# Patient Record
Sex: FEMALE | Race: ASIAN | Employment: OTHER | URBAN - METROPOLITAN AREA
[De-identification: names, ages, dates, MRNs, and addresses within clinical notes are randomized per-mention and may not be internally consistent; named-entity substitution may affect disease eponyms.]

---

## 2018-01-12 NOTE — MISCELLANEOUS
Message  GI Reminder Recall [facility]:   Date: 05/26/2016   Dear Janessa Mendoza:     Review of our records shows you are due for the following: Colonoscopy  Please call the following office to schedule your appointment:   [facility], [facility] [address] [place] [address], [address] [place] [address]  [phone]  We look forward to hearing from you! Sincerely,         Signatures   Electronically signed by :  Myla Irby, ; May 26 2016  4:19PM EST                       (Author)

## 2021-04-11 ENCOUNTER — PATIENT MESSAGE (OUTPATIENT)
Dept: INTERNAL MEDICINE CLINIC | Facility: CLINIC | Age: 66
End: 2021-04-11

## 2021-04-12 NOTE — TELEPHONE ENCOUNTER
From: Yaa Lund  To: Tanja Irizarry MD  Sent: 4/11/2021 1:56 PM CDT  Subject: Non-Urgent Medical Question    Hi, could you order a COVID vaccine for me so I can schedule it?  Currently, I don't see an option to schedule a vaccine for myself even though

## 2021-04-13 ENCOUNTER — IMMUNIZATION (OUTPATIENT)
Dept: LAB | Age: 66
End: 2021-04-13
Attending: HOSPITALIST
Payer: MEDICARE

## 2021-04-13 DIAGNOSIS — Z23 NEED FOR VACCINATION: Primary | ICD-10-CM

## 2021-04-13 PROCEDURE — 0001A SARSCOV2 VAC 30MCG/0.3ML IM: CPT

## 2021-04-26 ENCOUNTER — OFFICE VISIT (OUTPATIENT)
Dept: INTERNAL MEDICINE CLINIC | Facility: CLINIC | Age: 66
End: 2021-04-26
Payer: MEDICARE

## 2021-04-26 ENCOUNTER — LAB ENCOUNTER (OUTPATIENT)
Dept: LAB | Age: 66
End: 2021-04-26
Attending: INTERNAL MEDICINE
Payer: MEDICARE

## 2021-04-26 VITALS
HEART RATE: 76 BPM | RESPIRATION RATE: 12 BRPM | DIASTOLIC BLOOD PRESSURE: 86 MMHG | BODY MASS INDEX: 25.61 KG/M2 | SYSTOLIC BLOOD PRESSURE: 144 MMHG | WEIGHT: 133.88 LBS | HEIGHT: 60.5 IN | TEMPERATURE: 99 F

## 2021-04-26 DIAGNOSIS — Z13.6 ENCOUNTER FOR SCREENING FOR CARDIOVASCULAR DISORDERS: ICD-10-CM

## 2021-04-26 DIAGNOSIS — Z78.0 POSTMENOPAUSAL: ICD-10-CM

## 2021-04-26 DIAGNOSIS — R03.0 ELEVATED BLOOD PRESSURE READING: ICD-10-CM

## 2021-04-26 DIAGNOSIS — Z11.59 NEED FOR HEPATITIS C SCREENING TEST: ICD-10-CM

## 2021-04-26 DIAGNOSIS — Z12.11 SCREENING FOR COLON CANCER: ICD-10-CM

## 2021-04-26 DIAGNOSIS — Z00.00 ENCOUNTER FOR ANNUAL HEALTH EXAMINATION: ICD-10-CM

## 2021-04-26 DIAGNOSIS — M85.80 OSTEOPENIA, UNSPECIFIED LOCATION: ICD-10-CM

## 2021-04-26 DIAGNOSIS — Z12.31 VISIT FOR SCREENING MAMMOGRAM: ICD-10-CM

## 2021-04-26 DIAGNOSIS — Z13.5 SCREENING FOR GLAUCOMA: ICD-10-CM

## 2021-04-26 PROCEDURE — 85025 COMPLETE CBC W/AUTO DIFF WBC: CPT

## 2021-04-26 PROCEDURE — 86803 HEPATITIS C AB TEST: CPT

## 2021-04-26 PROCEDURE — 82306 VITAMIN D 25 HYDROXY: CPT

## 2021-04-26 PROCEDURE — 99204 OFFICE O/P NEW MOD 45 MIN: CPT | Performed by: INTERNAL MEDICINE

## 2021-04-26 PROCEDURE — G0438 PPPS, INITIAL VISIT: HCPCS | Performed by: INTERNAL MEDICINE

## 2021-04-26 PROCEDURE — 80053 COMPREHEN METABOLIC PANEL: CPT

## 2021-04-26 PROCEDURE — 36415 COLL VENOUS BLD VENIPUNCTURE: CPT

## 2021-04-26 PROCEDURE — 80061 LIPID PANEL: CPT

## 2021-04-26 NOTE — PATIENT INSTRUCTIONS
Alma Arndt SCREENING SCHEDULE   Tests on this list are recommended by your physician but may not be covered, or covered at this frequency, by your insurer. Please check with your insurance carrier before scheduling to verify coverage.    OREN this or any previous visit. No flowsheet data found. Fecal Occult Blood   Covered Annually No results found for: FOB, OCCULTSTOOL No flowsheet data found.      Barium Enema-   uncomfortable but covered  Covered but uncomfortable   Glaucoma Screening Medium/high risk factors:   End-stage renal disease   Hemophiliacs who received Factor VIII or IX concentrates   Clients of institutions for the mentally retarded   Persons who live in the same house as a HepB virus carrier   Homosexual men   Illicit injec

## 2021-04-26 NOTE — PROGRESS NOTES
HPI:   Radha Meng is a 76year old female who presents for a Medicare Initial Preventative Physical Exam (Welcome to Medicare- < 12 months on Medicare). No current complaints.      [place] and just came back to the [address] [place] [address] [address] Patient Care Team:  Paulette Qureshi MD as PCP - General (Internal Medicine)    There is no problem list on file for this patient.     Wt Readings from Last 3 Encounters:  04/26/21 : 133 lb 14.4 oz (60.7 kg)     Last Cholesterol Labs:   No results found for: C gallop, regular rate and rhythm  Abdomen: soft, non-tender; bowel sounds normal; no masses,  no organomegaly  Pelvic: GENITAL/URINARY:  External Genitalia:  General appearance; normal, Hair distribution; normal, Lesions absent, Urethral Meatus:  Size nathalia reading  -     COMP METABOLIC PANEL (14); Future  -     LIPID PANEL; Future  -     CBC WITH DIFFERENTIAL WITH PLATELET; Future  Improved upon recheck. Check at home and if elevated bring in readings and cuff.      Osteopenia, unspecified location  -     V Screen every 10 years Colonoscopy Never done Update Health Maintenance if applicable    Flex Sigmoidoscopy Screen every 10 years No results found for this or any previous visit. No flowsheet data found.      Fecal Occult Blood Annually No results found for: Medicare Part B No vaccine history found This may be covered with your pharmacy  prescription benefits                        Template: MERLYN GOLDBERG MEDICARE ANNUAL ASSESSMENT FEMALE [68671]

## 2021-04-27 NOTE — PROGRESS NOTES
Spoke to pt. Made aware of results & recommendations. Pt voiced understanding.   Rx and lab orders sent

## 2021-05-04 ENCOUNTER — IMMUNIZATION (OUTPATIENT)
Dept: LAB | Age: 66
End: 2021-05-04
Attending: HOSPITALIST
Payer: MEDICARE

## 2021-05-04 DIAGNOSIS — Z23 NEED FOR VACCINATION: Primary | ICD-10-CM

## 2021-05-04 PROCEDURE — 0002A SARSCOV2 VAC 30MCG/0.3ML IM: CPT

## 2021-05-27 ENCOUNTER — PATIENT MESSAGE (OUTPATIENT)
Dept: INTERNAL MEDICINE CLINIC | Facility: CLINIC | Age: 66
End: 2021-05-27

## 2021-05-27 NOTE — TELEPHONE ENCOUNTER
From: Kenneth Lauren  To: Timothy Hernandez MD  Sent: 5/27/2021 3:25 PM CDT  Subject: Other    Referring to your letter today (5/27/21), I would like to inform that I would like to go through the tests you prescribed sometime towards the early part of 2022.  A

## 2021-06-18 ENCOUNTER — PATIENT MESSAGE (OUTPATIENT)
Dept: INTERNAL MEDICINE CLINIC | Facility: CLINIC | Age: 66
End: 2021-06-18

## 2021-06-21 NOTE — TELEPHONE ENCOUNTER
From: Sabrina Aparicio  To: Duke Melvin MD  Sent: 6/18/2021 5:23 PM CDT  Subject: Prescription Question    Vit D course prescribed by you are completed. I'll be out of town from 6/23 to 7/27.  You were suggesting that I should go for a test after completin

## 2021-06-22 ENCOUNTER — LABORATORY ENCOUNTER (OUTPATIENT)
Dept: LAB | Age: 66
End: 2021-06-22
Attending: INTERNAL MEDICINE
Payer: MEDICARE

## 2021-06-22 DIAGNOSIS — E55.9 VITAMIN D DEFICIENCY: ICD-10-CM

## 2021-06-22 DIAGNOSIS — E56.9 VITAMIN DEFICIENCY: ICD-10-CM

## 2021-06-22 PROCEDURE — 36415 COLL VENOUS BLD VENIPUNCTURE: CPT

## 2021-06-22 PROCEDURE — 82306 VITAMIN D 25 HYDROXY: CPT

## 2021-10-29 ENCOUNTER — PATIENT MESSAGE (OUTPATIENT)
Dept: INTERNAL MEDICINE CLINIC | Facility: CLINIC | Age: 66
End: 2021-10-29

## 2021-10-29 ENCOUNTER — HOSPITAL ENCOUNTER (OUTPATIENT)
Dept: MAMMOGRAPHY | Age: 66
Discharge: HOME | End: 2021-10-29
Attending: INTERNAL MEDICINE
Payer: MEDICARE

## 2021-10-29 ENCOUNTER — HOSPITAL ENCOUNTER (OUTPATIENT)
Dept: BONE DENSITY | Age: 66
Discharge: HOME | End: 2021-10-29
Attending: INTERNAL MEDICINE
Payer: MEDICARE

## 2021-10-29 DIAGNOSIS — Z12.31 VISIT FOR SCREENING MAMMOGRAM: ICD-10-CM

## 2021-10-29 DIAGNOSIS — Z78.0 POSTMENOPAUSAL: ICD-10-CM

## 2021-10-29 PROCEDURE — 77080 DXA BONE DENSITY AXIAL: CPT | Performed by: INTERNAL MEDICINE

## 2021-10-29 PROCEDURE — 77063 BREAST TOMOSYNTHESIS BI: CPT | Performed by: INTERNAL MEDICINE

## 2021-10-29 PROCEDURE — 77067 SCR MAMMO BI INCL CAD: CPT | Performed by: INTERNAL MEDICINE

## 2021-10-29 NOTE — TELEPHONE ENCOUNTER
From: Renetta Arreguin  To: Thania Moe MD  Sent: 10/29/2021 11:26 AM CDT  Subject: Mammo and Dexa scan    Just wanted to inform that I got my Mammo and Dexa screenings done today at [facility], [facility].  I gave them my previous imagings from

## 2021-11-03 ENCOUNTER — LAB ENCOUNTER (OUTPATIENT)
Dept: LAB | Age: 66
End: 2021-11-03
Attending: INTERNAL MEDICINE
Payer: MEDICARE

## 2021-11-03 ENCOUNTER — OFFICE VISIT (OUTPATIENT)
Dept: INTERNAL MEDICINE CLINIC | Facility: CLINIC | Age: 66
End: 2021-11-03
Payer: MEDICARE

## 2021-11-03 VITALS
DIASTOLIC BLOOD PRESSURE: 72 MMHG | BODY MASS INDEX: 24.41 KG/M2 | OXYGEN SATURATION: 100 % | RESPIRATION RATE: 16 BRPM | SYSTOLIC BLOOD PRESSURE: 132 MMHG | HEIGHT: 60.5 IN | TEMPERATURE: 99 F | WEIGHT: 127.63 LBS | HEART RATE: 95 BPM

## 2021-11-03 DIAGNOSIS — M81.8 OTHER OSTEOPOROSIS WITHOUT CURRENT PATHOLOGICAL FRACTURE: Primary | ICD-10-CM

## 2021-11-03 DIAGNOSIS — R03.0 ELEVATED BLOOD PRESSURE READING: ICD-10-CM

## 2021-11-03 DIAGNOSIS — M81.8 OTHER OSTEOPOROSIS WITHOUT CURRENT PATHOLOGICAL FRACTURE: ICD-10-CM

## 2021-11-03 PROCEDURE — 99213 OFFICE O/P EST LOW 20 MIN: CPT | Performed by: INTERNAL MEDICINE

## 2021-11-03 PROCEDURE — 82306 VITAMIN D 25 HYDROXY: CPT

## 2021-11-03 PROCEDURE — 90662 IIV NO PRSV INCREASED AG IM: CPT | Performed by: INTERNAL MEDICINE

## 2021-11-03 PROCEDURE — 36415 COLL VENOUS BLD VENIPUNCTURE: CPT

## 2021-11-03 PROCEDURE — G0008 ADMIN INFLUENZA VIRUS VAC: HCPCS | Performed by: INTERNAL MEDICINE

## 2021-11-03 NOTE — PROGRESS NOTES
[facility]    CHIEF COMPLAINT:  Patient presents with: Follow - Up: Pt here to review Dexa scan results. HM: Cscope not on file, jose 10/29/21, physical 4/26/21        HISTORY OF PRESENT ILLNESS:  Here for follow up dexa scan.    Showed osteoporos - VITAMIN D, 25-HYDROXY; Future  - ENDOCRINOLOGY - INTERNAL    2. Elevated blood pressure reading  Normal at home. Monitor periodically. Return for annual wellness visit when due.       Kirstin Vivar MD

## 2021-11-17 ENCOUNTER — PATIENT MESSAGE (OUTPATIENT)
Dept: INTERNAL MEDICINE CLINIC | Facility: CLINIC | Age: 66
End: 2021-11-17

## 2021-11-17 NOTE — TELEPHONE ENCOUNTER
From: Orestes Norberto  To: Primo Solorzano MD  Sent: 11/17/2021 11:21 AM CST  Subject: Covid Booster shot    Good morning, just to let you know for the records that I took my Covid Booster   [employer] )shot on 11/15/21. Thank you.

## 2021-12-22 ENCOUNTER — PATIENT MESSAGE (OUTPATIENT)
Dept: INTERNAL MEDICINE CLINIC | Facility: CLINIC | Age: 66
End: 2021-12-22

## 2021-12-22 NOTE — TELEPHONE ENCOUNTER
From: Sabrina Aparicio  To: Duke Melvin MD  Sent: 12/22/2021 8:46 AM CST  Subject: Prevnar 13 Vaccine    GM, took Prevnar 13 yest. In our Pharmacy. Please update your records.   Thanks  [place] [employer]

## 2022-02-17 ENCOUNTER — PATIENT MESSAGE (OUTPATIENT)
Dept: INTERNAL MEDICINE CLINIC | Facility: CLINIC | Age: 67
End: 2022-02-17

## 2022-02-18 ENCOUNTER — PATIENT MESSAGE (OUTPATIENT)
Dept: INTERNAL MEDICINE CLINIC | Facility: CLINIC | Age: 67
End: 2022-02-18

## 2022-02-18 NOTE — TELEPHONE ENCOUNTER
From: Marvin Baez  To: Margarette Terry MD  Sent: 2/17/2022 4:35 PM CST  Subject: Endocrinologist visit    Hi Dr. Martin Plain time you referred me to Arcadio Olivier, Endocrinologist for taking Zoledronic acid infusion for my bone density issue. Last year I took it in [facility] on Feb 24th 2021. As it is one year past I took an appointment as you suggested for March 1st. Can you please send a prescription for the above so I can take it when I visit Dr. Demian Romano.     Thank you  Ken Rae

## 2022-03-01 ENCOUNTER — OFFICE VISIT (OUTPATIENT)
Dept: ENDOCRINOLOGY CLINIC | Facility: CLINIC | Age: 67
End: 2022-03-01
Payer: MEDICARE

## 2022-03-01 ENCOUNTER — LAB ENCOUNTER (OUTPATIENT)
Dept: LAB | Facility: HOSPITAL | Age: 67
End: 2022-03-01
Attending: INTERNAL MEDICINE
Payer: MEDICARE

## 2022-03-01 VITALS
WEIGHT: 124 LBS | DIASTOLIC BLOOD PRESSURE: 78 MMHG | BODY MASS INDEX: 24 KG/M2 | SYSTOLIC BLOOD PRESSURE: 156 MMHG | HEART RATE: 69 BPM

## 2022-03-01 DIAGNOSIS — M81.0 AGE-RELATED OSTEOPOROSIS WITHOUT CURRENT PATHOLOGICAL FRACTURE: ICD-10-CM

## 2022-03-01 DIAGNOSIS — E55.9 VITAMIN D DEFICIENCY: ICD-10-CM

## 2022-03-01 DIAGNOSIS — M81.0 AGE-RELATED OSTEOPOROSIS WITHOUT CURRENT PATHOLOGICAL FRACTURE: Primary | ICD-10-CM

## 2022-03-01 LAB
ALBUMIN SERPL-MCNC: 4.2 G/DL (ref 3.4–5)
ALBUMIN/GLOB SERPL: 1.2 {RATIO} (ref 1–2)
ALT SERPL-CCNC: 24 U/L
ANION GAP SERPL CALC-SCNC: 4 MMOL/L (ref 0–18)
AST SERPL-CCNC: 28 U/L (ref 15–37)
BILIRUB SERPL-MCNC: 0.3 MG/DL (ref 0.1–2)
BUN BLD-MCNC: 20 MG/DL (ref 7–18)
BUN/CREAT SERPL: 27.8 (ref 10–20)
CALCIUM BLD-MCNC: 10.3 MG/DL (ref 8.5–10.1)
CHLORIDE SERPL-SCNC: 106 MMOL/L (ref 98–112)
CO2 SERPL-SCNC: 27 MMOL/L (ref 21–32)
CREAT BLD-MCNC: 0.72 MG/DL
FASTING STATUS PATIENT QL REPORTED: NO
GLOBULIN PLAS-MCNC: 3.6 G/DL (ref 2.8–4.4)
GLUCOSE BLD-MCNC: 83 MG/DL (ref 70–99)
OSMOLALITY SERPL CALC.SUM OF ELEC: 286 MOSM/KG (ref 275–295)
POTASSIUM SERPL-SCNC: 5 MMOL/L (ref 3.5–5.1)
PROT SERPL-MCNC: 7.8 G/DL (ref 6.4–8.2)
PTH-INTACT SERPL-MCNC: 31.4 PG/ML (ref 18.5–88)
SODIUM SERPL-SCNC: 137 MMOL/L (ref 136–145)
VIT D+METAB SERPL-MCNC: 30.5 NG/ML (ref 30–100)

## 2022-03-01 PROCEDURE — 82306 VITAMIN D 25 HYDROXY: CPT | Performed by: INTERNAL MEDICINE

## 2022-03-01 PROCEDURE — 83970 ASSAY OF PARATHORMONE: CPT

## 2022-03-01 PROCEDURE — 99203 OFFICE O/P NEW LOW 30 MIN: CPT | Performed by: INTERNAL MEDICINE

## 2022-03-01 PROCEDURE — 36415 COLL VENOUS BLD VENIPUNCTURE: CPT

## 2022-03-01 PROCEDURE — 84080 ASSAY ALKALINE PHOSPHATASES: CPT

## 2022-03-01 PROCEDURE — 80053 COMPREHEN METABOLIC PANEL: CPT

## 2022-03-04 LAB — BONE SPECIFIC ALKALINE PHOSPHATASE: 12.4 UG/L

## 2022-03-30 ENCOUNTER — HOSPITAL ENCOUNTER (OUTPATIENT)
Facility: HOSPITAL | Age: 67
Setting detail: HOSPITAL OUTPATIENT SURGERY
Discharge: HOME | End: 2022-03-30
Attending: INTERNAL MEDICINE | Admitting: INTERNAL MEDICINE
Payer: MEDICARE

## 2022-03-30 ENCOUNTER — ANESTHESIA EVENT (OUTPATIENT)
Dept: ENDOSCOPY | Facility: HOSPITAL | Age: 67
End: 2022-03-30
Payer: MEDICARE

## 2022-03-30 ENCOUNTER — ANESTHESIA (OUTPATIENT)
Dept: ENDOSCOPY | Facility: HOSPITAL | Age: 67
End: 2022-03-30
Payer: MEDICARE

## 2022-03-30 VITALS
OXYGEN SATURATION: 98 % | HEART RATE: 67 BPM | DIASTOLIC BLOOD PRESSURE: 65 MMHG | HEIGHT: 60.5 IN | TEMPERATURE: 99 F | SYSTOLIC BLOOD PRESSURE: 139 MMHG | WEIGHT: 124 LBS | BODY MASS INDEX: 23.72 KG/M2 | RESPIRATION RATE: 18 BRPM

## 2022-03-30 DIAGNOSIS — Z12.11 COLON CANCER SCREENING: ICD-10-CM

## 2022-03-30 PROCEDURE — 88305 TISSUE EXAM BY PATHOLOGIST: CPT | Performed by: INTERNAL MEDICINE

## 2022-03-30 PROCEDURE — 0DBN8ZX EXCISION OF SIGMOID COLON, VIA NATURAL OR ARTIFICIAL OPENING ENDOSCOPIC, DIAGNOSTIC: ICD-10-PCS | Performed by: INTERNAL MEDICINE

## 2022-03-30 RX ORDER — SODIUM CHLORIDE, SODIUM LACTATE, POTASSIUM CHLORIDE, CALCIUM CHLORIDE 600; 310; 30; 20 MG/100ML; MG/100ML; MG/100ML; MG/100ML
INJECTION, SOLUTION INTRAVENOUS CONTINUOUS
Status: DISCONTINUED | OUTPATIENT
Start: 2022-03-30 | End: 2022-03-30

## 2022-03-30 RX ORDER — BIOTIN 1 MG
1 TABLET ORAL DAILY
COMMUNITY

## 2022-03-30 RX ORDER — LABETALOL HYDROCHLORIDE 5 MG/ML
5 INJECTION, SOLUTION INTRAVENOUS EVERY 5 MIN PRN
Status: DISCONTINUED | OUTPATIENT
Start: 2022-03-30 | End: 2022-03-30

## 2022-03-30 RX ORDER — LIDOCAINE HYDROCHLORIDE 10 MG/ML
INJECTION, SOLUTION EPIDURAL; INFILTRATION; INTRACAUDAL; PERINEURAL AS NEEDED
Status: DISCONTINUED | OUTPATIENT
Start: 2022-03-30 | End: 2022-03-30 | Stop reason: SURG

## 2022-03-30 RX ORDER — NALOXONE HYDROCHLORIDE 0.4 MG/ML
80 INJECTION, SOLUTION INTRAMUSCULAR; INTRAVENOUS; SUBCUTANEOUS AS NEEDED
Status: DISCONTINUED | OUTPATIENT
Start: 2022-03-30 | End: 2022-03-30

## 2022-03-30 RX ORDER — ONDANSETRON 2 MG/ML
4 INJECTION INTRAMUSCULAR; INTRAVENOUS AS NEEDED
Status: DISCONTINUED | OUTPATIENT
Start: 2022-03-30 | End: 2022-03-30

## 2022-03-30 RX ORDER — HYDROMORPHONE HYDROCHLORIDE 1 MG/ML
0.2 INJECTION, SOLUTION INTRAMUSCULAR; INTRAVENOUS; SUBCUTANEOUS EVERY 5 MIN PRN
Status: DISCONTINUED | OUTPATIENT
Start: 2022-03-30 | End: 2022-03-30

## 2022-03-30 RX ORDER — METOCLOPRAMIDE HYDROCHLORIDE 5 MG/ML
10 INJECTION INTRAMUSCULAR; INTRAVENOUS AS NEEDED
Status: DISCONTINUED | OUTPATIENT
Start: 2022-03-30 | End: 2022-03-30

## 2022-03-30 RX ADMIN — SODIUM CHLORIDE, SODIUM LACTATE, POTASSIUM CHLORIDE, CALCIUM CHLORIDE: 600; 310; 30; 20 INJECTION, SOLUTION INTRAVENOUS at 08:35:00

## 2022-03-30 RX ADMIN — SODIUM CHLORIDE, SODIUM LACTATE, POTASSIUM CHLORIDE, CALCIUM CHLORIDE: 600; 310; 30; 20 INJECTION, SOLUTION INTRAVENOUS at 08:19:00

## 2022-03-30 RX ADMIN — LIDOCAINE HYDROCHLORIDE 50 MG: 10 INJECTION, SOLUTION EPIDURAL; INFILTRATION; INTRACAUDAL; PERINEURAL at 08:22:00

## 2022-03-30 NOTE — ANESTHESIA POSTPROCEDURE EVALUATION
[address] [facility] Patient Status:  Hospital Outpatient Surgery   Age/Gender 78year old female MRN [id number]   Location [address] [facility] 28 Attending Ming Cole, [address] Day # 0 PCP Jen Owusu MD       Anesthesia Post-op Note    COLONOSCOPY WITH COLD SNARE POLYPECTOMY    Procedure Summary     Date: 03/30/22 Room / Location: [facility] ENDOSCOPY 02 / [address] [facility] ENDOSCOPY    Anesthesia Start: 1338 Anesthesia Stop: 6764    Procedure: COLONOSCOPY WITH COLD SNARE POLYPECTOMY (N/A ) Diagnosis:       Colon cancer screening      (polyps, hemorrhoids)    Surgeons: Anahy Arriaza DO Anesthesiologist: Ramos Brantley MD    Anesthesia Type: MAC ASA Status: 1          Anesthesia Type: MAC    Vitals Value Taken Time   BP 90/52 03/30/22 0846   Temp  03/30/22 0846   Pulse 75 03/30/22 0846   Resp 14 03/30/22 0846   SpO2 98% 03/30/22 0846       Patient Location: Endoscopy    Anesthesia Type: MAC    Airway Patency: patent    Postop Pain Control: adequate    Mental Status: mildly sedated but able to meaningfully participate in the post-anesthesia evaluation    Nausea/Vomiting: none    Cardiopulmonary/Hydration status: stable euvolemic    Complications: no apparent anesthesia related complications    Postop vital signs: stable    Dental Exam: Unchanged from Preop    Patient to be discharged home when criteria met.

## 2022-04-05 ENCOUNTER — PATIENT MESSAGE (OUTPATIENT)
Dept: INTERNAL MEDICINE CLINIC | Facility: CLINIC | Age: 67
End: 2022-04-05

## 2022-04-05 NOTE — TELEPHONE ENCOUNTER
From: Mariusz Lawson  To: Erlin Fink MD  Sent: 4/5/2022 8:42 AM CDT  Subject: Colonoscopy test    Hi Dr. Branden Morris morning,  My Colonoscopy test was done on 30th March and all good as you can see the test result in mychart.      Thank you  Gianni Villasenor

## 2022-04-24 ENCOUNTER — PATIENT MESSAGE (OUTPATIENT)
Dept: INTERNAL MEDICINE CLINIC | Facility: CLINIC | Age: 67
End: 2022-04-24

## 2022-04-27 ENCOUNTER — OFFICE VISIT (OUTPATIENT)
Dept: INTERNAL MEDICINE CLINIC | Facility: CLINIC | Age: 67
End: 2022-04-27
Payer: MEDICARE

## 2022-04-27 VITALS
WEIGHT: 125.13 LBS | HEART RATE: 72 BPM | OXYGEN SATURATION: 99 % | TEMPERATURE: 97 F | RESPIRATION RATE: 16 BRPM | SYSTOLIC BLOOD PRESSURE: 118 MMHG | HEIGHT: 60 IN | BODY MASS INDEX: 24.57 KG/M2 | DIASTOLIC BLOOD PRESSURE: 68 MMHG

## 2022-04-27 DIAGNOSIS — M81.8 OTHER OSTEOPOROSIS WITHOUT CURRENT PATHOLOGICAL FRACTURE: ICD-10-CM

## 2022-04-27 DIAGNOSIS — L98.9 SKIN LESION: ICD-10-CM

## 2022-04-27 DIAGNOSIS — Z00.00 ENCOUNTER FOR ANNUAL HEALTH EXAMINATION: ICD-10-CM

## 2022-04-27 DIAGNOSIS — Z12.31 BREAST CANCER SCREENING BY MAMMOGRAM: ICD-10-CM

## 2022-04-27 DIAGNOSIS — E78.2 MIXED HYPERLIPIDEMIA: ICD-10-CM

## 2022-04-27 PROCEDURE — G0439 PPPS, SUBSEQ VISIT: HCPCS | Performed by: INTERNAL MEDICINE

## 2022-04-27 PROCEDURE — 99397 PER PM REEVAL EST PAT 65+ YR: CPT | Performed by: INTERNAL MEDICINE

## 2022-04-27 PROCEDURE — 3074F SYST BP LT 130 MM HG: CPT | Performed by: INTERNAL MEDICINE

## 2022-04-27 PROCEDURE — 3078F DIAST BP <80 MM HG: CPT | Performed by: INTERNAL MEDICINE

## 2022-04-27 PROCEDURE — 96160 PT-FOCUSED HLTH RISK ASSMT: CPT | Performed by: INTERNAL MEDICINE

## 2022-04-27 PROCEDURE — 3008F BODY MASS INDEX DOCD: CPT | Performed by: INTERNAL MEDICINE

## 2022-05-04 ENCOUNTER — LAB ENCOUNTER (OUTPATIENT)
Dept: LAB | Age: 67
End: 2022-05-04
Attending: INTERNAL MEDICINE
Payer: MEDICARE

## 2022-05-04 DIAGNOSIS — R79.89 HIGH SERUM CALCIUM: ICD-10-CM

## 2022-05-04 DIAGNOSIS — E78.2 MIXED HYPERLIPIDEMIA: ICD-10-CM

## 2022-05-04 LAB
CALCIUM BLD-MCNC: 9.5 MG/DL (ref 8.5–10.1)
CHOLEST SERPL-MCNC: 191 MG/DL (ref ?–200)
FASTING PATIENT LIPID ANSWER: YES
HDLC SERPL-MCNC: 52 MG/DL (ref 40–59)
LDLC SERPL CALC-MCNC: 122 MG/DL (ref ?–100)
NONHDLC SERPL-MCNC: 139 MG/DL (ref ?–130)
TRIGL SERPL-MCNC: 94 MG/DL (ref 30–149)
VLDLC SERPL CALC-MCNC: 17 MG/DL (ref 0–30)

## 2022-05-04 PROCEDURE — 82310 ASSAY OF CALCIUM: CPT

## 2022-05-04 PROCEDURE — 36415 COLL VENOUS BLD VENIPUNCTURE: CPT

## 2022-05-04 PROCEDURE — 80061 LIPID PANEL: CPT

## 2022-05-16 ENCOUNTER — PATIENT MESSAGE (OUTPATIENT)
Dept: INTERNAL MEDICINE CLINIC | Facility: CLINIC | Age: 67
End: 2022-05-16

## 2022-05-17 NOTE — TELEPHONE ENCOUNTER
From: Sven Jay  To: Dina Chacon MD  Sent: 5/16/2022 6:57 PM CDT  Subject: Covid-19 - Booster shot #2    I would like to inform that I took Covid-19 - Booster shot #2 today (05/16/2022) at [facility], [address] [place] [address] details of which are as below:   [facility] - Store # [id number]  Date: 5/16/2022  LOT: [id number] Exp: 8/31/22  [employer]  This is for your reference and records.   Thank you

## 2022-06-01 ENCOUNTER — PATIENT MESSAGE (OUTPATIENT)
Dept: ENDOCRINOLOGY CLINIC | Facility: CLINIC | Age: 67
End: 2022-06-01

## 2022-06-02 NOTE — TELEPHONE ENCOUNTER
From: Michael Luna  To: Stephanie Lima MD  Sent: 6/1/2022 4:37 PM CDT  Subject: Dental clearance for reclast    Hi Dr, Today I got the dental clearance from the dentist. So can you please give me a date for my reclast as soon as possible. Again on June 13th, we will be leaving out of country [place] 3 months.    Thank you  Isabela Jeffers

## 2022-06-09 ENCOUNTER — PATIENT MESSAGE (OUTPATIENT)
Dept: ENDOCRINOLOGY CLINIC | Facility: CLINIC | Age: 67
End: 2022-06-09

## 2022-06-09 NOTE — TELEPHONE ENCOUNTER
From: Ean Willis  Sent: 6/9/2022 11:20 AM CDT  To: Serina Alvarado Clinical Staff  Subject: Dental clearance for reclast    Good morning,   Any update from insurance co. about reclast authorization      Thanks  Nicolle Oden

## 2022-06-10 ENCOUNTER — PATIENT MESSAGE (OUTPATIENT)
Dept: ENDOCRINOLOGY CLINIC | Facility: CLINIC | Age: 67
End: 2022-06-10

## 2022-06-10 NOTE — TELEPHONE ENCOUNTER
Dr. Noble Adams,  Please advise is RX for Reclast that was pended for approval on 6/1/22 has been printed/approved- no documentation that order has been faxed to [facility]

## 2022-06-11 RX ORDER — ZOLEDRONIC ACID 5 MG/100ML
5 INJECTION, SOLUTION INTRAVENOUS ONCE
Qty: 100 ML | Refills: 0 | Status: SHIPPED | OUTPATIENT
Start: 2022-06-11 | End: 2022-06-11

## 2022-06-13 ENCOUNTER — TELEPHONE (OUTPATIENT)
Dept: ENDOCRINOLOGY CLINIC | Facility: CLINIC | Age: 67
End: 2022-06-13

## 2022-06-13 DIAGNOSIS — M81.0 AGE-RELATED OSTEOPOROSIS WITHOUT CURRENT PATHOLOGICAL FRACTURE: Primary | ICD-10-CM

## 2022-06-13 RX ORDER — ZOLEDRONIC ACID 5 MG/100ML
5 INJECTION, SOLUTION INTRAVENOUS ONCE
Qty: 100 ML | Refills: 0 | Status: SHIPPED | OUTPATIENT
Start: 2022-06-13 | End: 2022-06-13

## 2022-06-13 NOTE — TELEPHONE ENCOUNTER
Dr. Rosanne Murdock,  Per message below - RX pended for Reclast with DX code M81.0 - please approve/print   Thanks

## 2022-06-13 NOTE — TELEPHONE ENCOUNTER
Sophy Rendon requesting new orders for Reclast to reflect dx codes. Please fax to Sophy Rendon at [phone]. For additional questions please call. Thank you.

## 2022-06-14 ENCOUNTER — TELEPHONE (OUTPATIENT)
Dept: ENDOCRINOLOGY CLINIC | Facility: CLINIC | Age: 67
End: 2022-06-14

## 2022-06-22 PROBLEM — M81.0 OSTEOPOROSIS: Status: ACTIVE | Noted: 2022-06-22

## 2022-07-19 ENCOUNTER — LAB ENCOUNTER (OUTPATIENT)
Dept: LAB | Age: 67
End: 2022-07-19
Attending: INTERNAL MEDICINE
Payer: MEDICARE

## 2022-07-19 DIAGNOSIS — M81.0 OSTEOPOROSIS: ICD-10-CM

## 2022-07-19 LAB
ALBUMIN SERPL-MCNC: 3.9 G/DL (ref 3.4–5)
CALCIUM BLD-MCNC: 9.9 MG/DL (ref 8.5–10.1)
CREAT BLD-MCNC: 0.79 MG/DL

## 2022-07-19 PROCEDURE — 82310 ASSAY OF CALCIUM: CPT

## 2022-07-19 PROCEDURE — 82565 ASSAY OF CREATININE: CPT

## 2022-07-19 PROCEDURE — 82040 ASSAY OF SERUM ALBUMIN: CPT

## 2022-07-21 ENCOUNTER — OFFICE VISIT (OUTPATIENT)
Dept: HEMATOLOGY/ONCOLOGY | Facility: HOSPITAL | Age: 67
End: 2022-07-21
Attending: INTERNAL MEDICINE
Payer: MEDICARE

## 2022-07-21 VITALS
OXYGEN SATURATION: 97 % | DIASTOLIC BLOOD PRESSURE: 60 MMHG | TEMPERATURE: 99 F | HEART RATE: 112 BPM | RESPIRATION RATE: 18 BRPM | SYSTOLIC BLOOD PRESSURE: 144 MMHG

## 2022-07-21 DIAGNOSIS — M81.0 OSTEOPOROSIS: Primary | ICD-10-CM

## 2022-07-21 PROCEDURE — 96365 THER/PROPH/DIAG IV INF INIT: CPT

## 2022-07-21 RX ORDER — ZOLEDRONIC ACID 5 MG/100ML
5 INJECTION, SOLUTION INTRAVENOUS ONCE
Status: COMPLETED | OUTPATIENT
Start: 2022-07-21 | End: 2022-07-21

## 2022-07-21 RX ORDER — ZOLEDRONIC ACID 5 MG/100ML
INJECTION, SOLUTION INTRAVENOUS
Status: COMPLETED
Start: 2022-07-21 | End: 2022-07-21

## 2022-07-21 RX ORDER — ZOLEDRONIC ACID 5 MG/100ML
5 INJECTION, SOLUTION INTRAVENOUS ONCE
Status: CANCELLED | OUTPATIENT
Start: 2022-07-21

## 2022-07-21 RX ADMIN — ZOLEDRONIC ACID 5 MG: 5 INJECTION, SOLUTION INTRAVENOUS at 14:06:00

## 2022-08-23 ENCOUNTER — PATIENT MESSAGE (OUTPATIENT)
Dept: INTERNAL MEDICINE CLINIC | Facility: CLINIC | Age: 67
End: 2022-08-23

## 2022-08-24 NOTE — TELEPHONE ENCOUNTER
From: Mario Fernandez  To: Yana Andujar MD  Sent: 8/23/2022 11:21 AM CDT  Subject: Shingles 2nd dose vaccination    Good morning,   Please find attached my Shingles 2nd dose vaccination information. Please update your records.   Thanks  [employer]

## 2022-11-02 ENCOUNTER — HOSPITAL ENCOUNTER (OUTPATIENT)
Dept: MAMMOGRAPHY | Age: 67
Discharge: HOME | End: 2022-11-02
Attending: INTERNAL MEDICINE
Payer: MEDICARE

## 2022-11-02 DIAGNOSIS — Z12.31 BREAST CANCER SCREENING BY MAMMOGRAM: ICD-10-CM

## 2022-11-02 PROCEDURE — 77067 SCR MAMMO BI INCL CAD: CPT | Performed by: INTERNAL MEDICINE

## 2022-11-02 PROCEDURE — 77063 BREAST TOMOSYNTHESIS BI: CPT | Performed by: INTERNAL MEDICINE

## 2022-11-09 ENCOUNTER — PATIENT MESSAGE (OUTPATIENT)
Dept: INTERNAL MEDICINE CLINIC | Facility: CLINIC | Age: 67
End: 2022-11-09

## 2023-03-02 ENCOUNTER — PATIENT MESSAGE (OUTPATIENT)
Dept: INTERNAL MEDICINE CLINIC | Facility: CLINIC | Age: 68
End: 2023-03-02

## 2023-03-02 NOTE — TELEPHONE ENCOUNTER
From: Lora Graf  To: Aguila Daily MD  Sent: 3/2/2023 12:07 PM CST  Subject: Prev 23    Good afternoon,     This is to let you know that I Just took my Prev 23 ( pneumovax 23) today. Please update my records.    Thank you  Vishnu Robledo

## 2023-03-17 NOTE — PROGRESS NOTES
Pt arrived for Reclast. Labs from 7/19 appropriate for treatment. She has received this medication in [facility] 1 yr ago. Drew handout given to patient     Reclast given and tolerated well. Pt aware of need to receive medication every 1 yr. Instructed her to push fluids today. Discharged home ambulatory. Saw pain  SC stimulator trial, procedure scheduled

## 2023-04-11 ENCOUNTER — OFFICE VISIT (OUTPATIENT)
Dept: INTERNAL MEDICINE CLINIC | Facility: CLINIC | Age: 68
End: 2023-04-11
Payer: MEDICARE

## 2023-04-11 VITALS
TEMPERATURE: 97 F | HEART RATE: 76 BPM | RESPIRATION RATE: 12 BRPM | HEIGHT: 60.5 IN | BODY MASS INDEX: 24.44 KG/M2 | DIASTOLIC BLOOD PRESSURE: 82 MMHG | SYSTOLIC BLOOD PRESSURE: 144 MMHG | WEIGHT: 127.81 LBS

## 2023-04-11 DIAGNOSIS — M81.0 AGE-RELATED OSTEOPOROSIS WITHOUT CURRENT PATHOLOGICAL FRACTURE: ICD-10-CM

## 2023-04-11 DIAGNOSIS — Z12.31 ENCOUNTER FOR SCREENING MAMMOGRAM FOR MALIGNANT NEOPLASM OF BREAST: ICD-10-CM

## 2023-04-11 DIAGNOSIS — Z00.00 ENCOUNTER FOR ANNUAL HEALTH EXAMINATION: Primary | ICD-10-CM

## 2023-04-11 DIAGNOSIS — E78.5 HYPERLIPIDEMIA, UNSPECIFIED HYPERLIPIDEMIA TYPE: ICD-10-CM

## 2023-04-11 RX ORDER — TIMOLOL MALEATE/LATANOPROST/PF 0.5-0.005%
DROPS OPHTHALMIC (EYE) DAILY
COMMUNITY
Start: 2022-03-12

## 2023-04-15 ENCOUNTER — PATIENT MESSAGE (OUTPATIENT)
Dept: INTERNAL MEDICINE CLINIC | Facility: CLINIC | Age: 68
End: 2023-04-15

## 2023-04-15 ENCOUNTER — LAB ENCOUNTER (OUTPATIENT)
Dept: LAB | Facility: HOSPITAL | Age: 68
End: 2023-04-15
Attending: INTERNAL MEDICINE
Payer: MEDICARE

## 2023-04-15 DIAGNOSIS — E78.5 HYPERLIPIDEMIA, UNSPECIFIED HYPERLIPIDEMIA TYPE: ICD-10-CM

## 2023-04-15 LAB
ALBUMIN SERPL-MCNC: 3.7 G/DL (ref 3.4–5)
ALBUMIN/GLOB SERPL: 1.1 {RATIO} (ref 1–2)
ALP LIVER SERPL-CCNC: 61 U/L
ALT SERPL-CCNC: 25 U/L
ANION GAP SERPL CALC-SCNC: 2 MMOL/L (ref 0–18)
AST SERPL-CCNC: 23 U/L (ref 15–37)
BASOPHILS # BLD AUTO: 0.04 X10(3) UL (ref 0–0.2)
BASOPHILS NFR BLD AUTO: 0.6 %
BILIRUB SERPL-MCNC: 0.4 MG/DL (ref 0.1–2)
BUN BLD-MCNC: 16 MG/DL (ref 7–18)
CALCIUM BLD-MCNC: 9.4 MG/DL (ref 8.5–10.1)
CHLORIDE SERPL-SCNC: 110 MMOL/L (ref 98–112)
CHOLEST SERPL-MCNC: 191 MG/DL (ref ?–200)
CO2 SERPL-SCNC: 27 MMOL/L (ref 21–32)
CREAT BLD-MCNC: 0.77 MG/DL
EOSINOPHIL # BLD AUTO: 0.1 X10(3) UL (ref 0–0.7)
EOSINOPHIL NFR BLD AUTO: 1.5 %
ERYTHROCYTE [DISTWIDTH] IN BLOOD BY AUTOMATED COUNT: 12.4 %
FASTING PATIENT LIPID ANSWER: YES
FASTING STATUS PATIENT QL REPORTED: YES
GFR SERPLBLD BASED ON 1.73 SQ M-ARVRAT: 84 ML/MIN/1.73M2 (ref 60–?)
GLOBULIN PLAS-MCNC: 3.4 G/DL (ref 2.8–4.4)
GLUCOSE BLD-MCNC: 93 MG/DL (ref 70–99)
HCT VFR BLD AUTO: 40.3 %
HDLC SERPL-MCNC: 57 MG/DL (ref 40–59)
HGB BLD-MCNC: 13.2 G/DL
IMM GRANULOCYTES # BLD AUTO: 0.01 X10(3) UL (ref 0–1)
IMM GRANULOCYTES NFR BLD: 0.2 %
LDLC SERPL CALC-MCNC: 110 MG/DL (ref ?–100)
LYMPHOCYTES # BLD AUTO: 2.04 X10(3) UL (ref 1–4)
LYMPHOCYTES NFR BLD AUTO: 31 %
MCH RBC QN AUTO: 30.3 PG (ref 26–34)
MCHC RBC AUTO-ENTMCNC: 32.8 G/DL (ref 31–37)
MCV RBC AUTO: 92.6 FL
MONOCYTES # BLD AUTO: 0.56 X10(3) UL (ref 0.1–1)
MONOCYTES NFR BLD AUTO: 8.5 %
NEUTROPHILS # BLD AUTO: 3.83 X10 (3) UL (ref 1.5–7.7)
NEUTROPHILS # BLD AUTO: 3.83 X10(3) UL (ref 1.5–7.7)
NEUTROPHILS NFR BLD AUTO: 58.2 %
NONHDLC SERPL-MCNC: 134 MG/DL (ref ?–130)
OSMOLALITY SERPL CALC.SUM OF ELEC: 289 MOSM/KG (ref 275–295)
PLATELET # BLD AUTO: 206 10(3)UL (ref 150–450)
POTASSIUM SERPL-SCNC: 5.1 MMOL/L (ref 3.5–5.1)
PROT SERPL-MCNC: 7.1 G/DL (ref 6.4–8.2)
RBC # BLD AUTO: 4.35 X10(6)UL
SODIUM SERPL-SCNC: 139 MMOL/L (ref 136–145)
T4 FREE SERPL-MCNC: 0.9 NG/DL (ref 0.8–1.7)
TRIGL SERPL-MCNC: 134 MG/DL (ref 30–149)
TSI SER-ACNC: 5.87 MIU/ML (ref 0.36–3.74)
VLDLC SERPL CALC-MCNC: 23 MG/DL (ref 0–30)
WBC # BLD AUTO: 6.6 X10(3) UL (ref 4–11)

## 2023-04-15 PROCEDURE — 85025 COMPLETE CBC W/AUTO DIFF WBC: CPT

## 2023-04-15 PROCEDURE — 36415 COLL VENOUS BLD VENIPUNCTURE: CPT

## 2023-04-15 PROCEDURE — 80061 LIPID PANEL: CPT

## 2023-04-15 PROCEDURE — 84439 ASSAY OF FREE THYROXINE: CPT

## 2023-04-15 PROCEDURE — 84443 ASSAY THYROID STIM HORMONE: CPT

## 2023-04-15 PROCEDURE — 80053 COMPREHEN METABOLIC PANEL: CPT

## 2023-04-17 NOTE — TELEPHONE ENCOUNTER
From: Sven Jay  To: Dina Chacon MD  Sent: 4/15/2023 1:31 PM CDT  Subject: Tetanus shot    Good afternoon,     Just took my Tetanus shot today. Please update your records.     Thanks  [employer]

## 2023-04-18 DIAGNOSIS — R79.89 ELEVATED TSH: ICD-10-CM

## 2023-04-18 DIAGNOSIS — E03.9 HYPOTHYROIDISM, UNSPECIFIED TYPE: Primary | ICD-10-CM

## 2023-06-19 ENCOUNTER — LAB ENCOUNTER (OUTPATIENT)
Dept: LAB | Age: 68
End: 2023-06-19
Attending: INTERNAL MEDICINE
Payer: MEDICARE

## 2023-06-19 DIAGNOSIS — E03.9 HYPOTHYROIDISM, UNSPECIFIED TYPE: Primary | ICD-10-CM

## 2023-06-19 DIAGNOSIS — R79.89 ELEVATED TSH: ICD-10-CM

## 2023-06-19 LAB
T4 FREE SERPL-MCNC: 0.9 NG/DL (ref 0.8–1.7)
TSI SER-ACNC: 3.84 MIU/ML (ref 0.36–3.74)

## 2023-06-19 PROCEDURE — 84439 ASSAY OF FREE THYROXINE: CPT

## 2023-06-19 PROCEDURE — 84443 ASSAY THYROID STIM HORMONE: CPT

## 2023-06-20 DIAGNOSIS — R79.89 ELEVATED TSH: Primary | ICD-10-CM

## 2023-08-21 ENCOUNTER — LAB ENCOUNTER (OUTPATIENT)
Dept: LAB | Age: 68
End: 2023-08-21
Attending: INTERNAL MEDICINE
Payer: MEDICARE

## 2023-08-21 DIAGNOSIS — R79.89 ELEVATED TSH: ICD-10-CM

## 2023-08-21 LAB
T4 FREE SERPL-MCNC: 1 NG/DL (ref 0.8–1.7)
TSI SER-ACNC: 4.93 MIU/ML (ref 0.36–3.74)

## 2023-08-21 PROCEDURE — 84443 ASSAY THYROID STIM HORMONE: CPT

## 2023-08-21 PROCEDURE — 84439 ASSAY OF FREE THYROXINE: CPT

## 2023-08-22 DIAGNOSIS — R79.89 ELEVATED TSH: Primary | ICD-10-CM

## 2023-12-07 ENCOUNTER — HOSPITAL ENCOUNTER (OUTPATIENT)
Dept: MAMMOGRAPHY | Age: 68
Discharge: HOME | End: 2023-12-07
Attending: INTERNAL MEDICINE
Payer: MEDICARE

## 2023-12-07 DIAGNOSIS — Z12.31 ENCOUNTER FOR SCREENING MAMMOGRAM FOR MALIGNANT NEOPLASM OF BREAST: ICD-10-CM

## 2023-12-07 PROCEDURE — 77063 BREAST TOMOSYNTHESIS BI: CPT | Performed by: INTERNAL MEDICINE

## 2023-12-07 PROCEDURE — 77067 SCR MAMMO BI INCL CAD: CPT | Performed by: INTERNAL MEDICINE

## 2024-02-12 ENCOUNTER — LAB ENCOUNTER (OUTPATIENT)
Dept: LAB | Age: 69
End: 2024-02-12
Attending: INTERNAL MEDICINE
Payer: MEDICARE

## 2024-02-12 DIAGNOSIS — R79.89 ELEVATED TSH: ICD-10-CM

## 2024-02-12 LAB — TSI SER-ACNC: 3.25 MIU/ML (ref 0.36–3.74)

## 2024-02-12 PROCEDURE — 84443 ASSAY THYROID STIM HORMONE: CPT

## 2024-05-12 NOTE — PROGRESS NOTES
Subjective:   Tracy Neville is a 68 year old female who presents for a MA (Medicare Advantage) Supervisit (Once per calendar year) and med check.     Mammo done 12/2023. Ordered.   Breast and pelvic done 4/2023. Breast exam done today. No indication for pelvic, not sexually active.   Dexa done 10/2021 showed osteoporosis. On reclast per endocrine. Dexa per endocrine.   Colonoscopy done 3/2022, repeat in 10 years. Had 1 adenoma.     Up to date prevnar, pneumovax, tdap, shingrix.   Get covid booster (last 12/2023) and rsv at her local pharmacy.     [facility] flowsheet reviewed.   No falls.   Memory testing normal.   Mood has been stable. No depression.   Seeing eye doctor yearly.      Bp borderline here today. Always controlled at home. Reading today was 127/62.     History/Other:   Fall Risk Assessment:   She has been screened for Falls and is low risk.      Cognitive Assessment:   She had a completely normal cognitive assessment - see flowsheet entries       Functional Ability/Status:   Tracy Neville has some abnormal functions as listed below:  She has Vision problems based on screening of functional status.       Depression Screening (PHQ-2/PHQ-9): PHQ-2 SCORE: 0  , done 5/9/2024   Last [place] Suicide Screening on 5/13/2024 was No Risk.       Advanced Directives:   She does NOT have a Living Will. [Do you have a living will?: No]  She does NOT have a Power of  for Health Care. [Do you have a healthcare power of ?: No (has packet)]  Discussed with patient and provided information.        Patient Active Problem List   Diagnosis    Age-related osteoporosis without current pathological fracture     Allergies:  She has No Known Allergies.    Current Medications:  Outpatient Medications Marked as Taking for the 5/13/24 encounter (Office Visit) with Bárbara Cabrera MD   Medication Sig    Latanoprost-Timolol Maleate 0.005-0.5 % Ophthalmic Solution daily.    Cholecalciferol (VITAMIN D3) 25 MCG (1000 UT)  Oral Cap Take 1 tablet by mouth daily.    Multiple Vitamin (MULTIVITAMIN OR) Take by mouth daily.       Medical History:  She  has no past medical history on file.  Surgical History:  She  has a past surgical history that includes colonoscopy and colonoscopy (N/A, 3/30/2022).   Family History:  Her family history includes Cancer (age of onset: 61) in her brother.  Social History:  She  reports that she has never smoked. She has never used smokeless tobacco. She reports that she does not drink alcohol and does not use drugs.    Tobacco:  She has never smoked tobacco.    CAGE Alcohol Screen:   CAGE screening score of 0 on 5/9/2024, showing low risk of alcohol abuse.      Patient Care Team:  Bárbara Cabrera MD as PCP - General (Internal Medicine)    Review of Systems  GENERAL: feels well otherwise  SKIN: denies any unusual skin lesions  EYES:denies blurred vision or double vision  HEENT: denies nasal congestion, sinus pain or ST  LUNGS: denies shortness of breath with exertion  CARDIOVASCULAR: denies chest pain on exertion  GI: denies abdominal pain,denies heartburn  : denies dysuria, vaginal discharge or itching  MUSCULOSKELETAL: denies back pain  NEURO: denies headaches  PSYCHE: denies depression or anxiety  HEMATOLOGIC: denies hx of anemia  ENDOCRINE: denies thyroid history  ALL/ASTHMA: denies hx of allergy or asthma     Objective:   Physical Exam  General Appearance:  Alert, cooperative, no distress, appears stated age   Head:  Normocephalic, without obvious abnormality, atraumatic   Eyes:  PERRL, conjunctiva/corneas clear, EOM's intact both eyes   Ears:  Normal TM's and external ear canals, both ears   Nose: Nares normal, septum midline,mucosa normal, no drainage or sinus tenderness   Throat: Lips, mucosa, and tongue normal; teeth and gums normal   Neck: Supple, symmetrical, trachea midline, no adenopathy;  thyroid: not enlarged, no carotid bruit or JVD   Back:   Symmetric, no curvature, ROM normal, no CVA  tenderness   Lungs:   Clear to auscultation bilaterally, respirations unlabored   Heart:  Regular rate and rhythm, S1 and S2 normal, no murmur, rub, or gallop   Abdomen:   Soft, non-tender, bowel sounds active all four quadrants,  no masses, no organomegaly   Pelvic: Deferred   Extremities: Extremities normal, atraumatic, no cyanosis or edema   Pulses: 2+ and symmetric   Skin: Skin color, texture, turgor normal, no rashes or lesions   Lymph nodes: Cervical, supraclavicular, and axillary nodes normal   Neurologic: Normal   Breasts: symmetric, no masses, no tenderness, no nipple discharge.     /62 (BP Location: Left arm, Patient Position: Sitting, Cuff Size: adult)   Pulse 80   Temp 97.3 °F (36.3 °C)   Resp 12   Ht 5' 0.5\" (1.537 m)   Wt 129 lb 4.8 oz (58.7 kg)   Breastfeeding No   BMI 24.84 kg/m²  Estimated body mass index is 24.84 kg/m² as calculated from the following:    Height as of this encounter: 5' 0.5\" (1.537 m).    Weight as of this encounter: 129 lb 4.8 oz (58.7 kg).    Medicare Hearing Assessment:   Hearing Screening    Screening Method: Finger Rub  Finger Rub Result: Pass               Assessment & Plan:   Tracy Neville is a 68 year old female who presents for a Medicare Assessment.     1. Encounter for annual health examination  Mammo done 12/2023. Ordered.   Breast and pelvic done 4/2023. Breast exam done today. No indication for pelvic, not sexually active.   Dexa done 10/2021 showed osteoporosis. On reclast per endocrine. Dexa per endocrine.   Colonoscopy done 3/2022, repeat in 10 years. Had 1 adenoma.     Up to date prevnar, pneumovax, tdap, shingrix.   Get covid booster (last 12/2023) and rsv at her local pharmacy.     AHA flowsheet reviewed.   No falls.   Memory testing normal.   Mood has been stable. No depression.   Seeing eye doctor yearly.      2. Age-related osteoporosis without current pathological fracture  Continue reclast per endocrine.   - VITAMIN D, 25-HYDROXY [70191][Q];  Future    3. Hyperlipidemia, unspecified hyperlipidemia type  Will check labs.   Diet controlled.   - Comp Metabolic Panel (14); Future  - Lipid Panel; Future    4. Encounter for screening mammogram for malignant neoplasm of breast  - [facility] JEFF 2D+3D SCREENING BILAT (CPT=77067/76434); Future      The patient indicates understanding of these issues and agrees to the plan.  Reinforced healthy diet, lifestyle, and exercise.      Return in about 1 year (around 5/13/2025) for supervisit.     Bárbara Cabrera MD, 5/11/2024     Supplementary Documentation:   General Health:  In the past six months, have you lost more than 10 pounds without trying?: 2 - No  Has your appetite been poor?: No  Type of Diet: Vegetarian  How does the patient maintain a good energy level?: Appropriate Exercise;Daily Walks;Stretching;Other  How would you describe your daily physical activity?: Moderate  How would you describe your current health state?: Good  How do you maintain positive mental well-being?: Social Interaction;Puzzles;Games;Visiting Friends;Visiting Family  On a scale of 0 to 10, with 0 being no pain and 10 being severe pain, what is your pain level?: 0 - (None)  In the past six months, have you experienced urine leakage?: 0-No  At any time do you feel concerned for the safety/well-being of yourself and/or your children, in your home or elsewhere?: No  Have you had any immunizations at another office such as Influenza, Hepatitis B, Tetanus, or Pneumococcal?: Yes       Tracy Neville's SCREENING SCHEDULE   Tests on this list are recommended by your physician but may not be covered, or covered at this frequency, by your insurer.   Please check with your insurance carrier before scheduling to verify coverage.   PREVENTATIVE SERVICES FREQUENCY &  COVERAGE DETAILS LAST COMPLETION DATE   Diabetes Screening    Fasting Blood Sugar /  Glucose    One screening every 12 months if never tested or if previously tested but not diagnosed with  pre-diabetes   One screening every 6 months if diagnosed with pre-diabetes Lab Results   Component Value Date    GLU 93 04/15/2023        Cardiovascular Disease Screening    Lipid Panel  Cholesterol  Lipoprotein (HDL)  Triglycerides Covered every 5 years for all Medicare beneficiaries without apparent signs or symptoms of cardiovascular disease Lab Results   Component Value Date    CHOLEST 191 04/15/2023    HDL 57 04/15/2023     (H) 04/15/2023    TRIG 134 04/15/2023         Electrocardiogram (EKG)   Covered if needed at Welcome to Medicare, and non-screening if indicated for medical reasons -      Ultrasound Screening for Abdominal Aortic Aneurysm (AAA) Covered once in a lifetime for one of the following risk factors    Men who are 65-75 years old and have ever smoked    Anyone with a family history -     Colorectal Cancer Screening  Covered for ages 50-85; only need ONE of the following:    Colonoscopy   Covered every 10 years    Covered every 2 years if patient is at high risk or previous colonoscopy was abnormal 03/30/2022    Health Maintenance   Topic Date Due    Colorectal Cancer Screening  03/30/2032       Flexible Sigmoidoscopy   Covered every 4 years -    Fecal Occult Blood Test Covered annually -   Bone Density Screening    Bone density screening    Covered every 2 years after age 65 if diagnosed with risk of osteoporosis or estrogen deficiency.    Covered yearly for long-term glucocorticoid medication use (Steroids) Last Dexa Scan:    XR DEXA BONE DENSITOMETRY (CPT=77080) 10/29/2021      No recommendations at this time   Pap and Pelvic    Pap   Covered every 2 years for women at normal risk; Annually if at high risk -  No recommendations at this time    Chlamydia Annually if high risk -  No recommendations at this time   Screening Mammogram    Mammogram     Recommend annually for all female patients aged 40 and older    One baseline mammogram covered for patients aged 35-39 12/07/2023    Health  Maintenance   Topic Date Due    Mammogram  12/07/2024       Immunizations    Influenza Covered once per flu season  Please get every year 12/08/2023  No recommendations at this time    Pneumococcal Each vaccine ([id number] & [id number]) covered once after 65 Prevnar 13: 12/21/2021    [id number]: 03/02/2023     No recommendations at this time    Hepatitis B One screening covered for patients with certain risk factors   -  No recommendations at this time    Tetanus Toxoid Not covered by Medicare Part B unless medically necessary (cut with metal); may be covered with your pharmacy prescription benefits -    Tetanus, Diptheria and Pertusis TD and TDaP Not covered by Medicare Part B -  No recommendations at this time    Zoster Not covered by Medicare Part B; may be covered with your pharmacy  prescription benefits -  No recommendations at this time

## 2024-05-13 ENCOUNTER — OFFICE VISIT (OUTPATIENT)
Dept: INTERNAL MEDICINE CLINIC | Facility: CLINIC | Age: 69
End: 2024-05-13
Payer: MEDICARE

## 2024-05-13 VITALS
SYSTOLIC BLOOD PRESSURE: 138 MMHG | HEART RATE: 80 BPM | HEIGHT: 60.5 IN | RESPIRATION RATE: 12 BRPM | WEIGHT: 129.31 LBS | TEMPERATURE: 97 F | BODY MASS INDEX: 24.73 KG/M2 | DIASTOLIC BLOOD PRESSURE: 62 MMHG

## 2024-05-13 DIAGNOSIS — E78.5 HYPERLIPIDEMIA, UNSPECIFIED HYPERLIPIDEMIA TYPE: ICD-10-CM

## 2024-05-13 DIAGNOSIS — Z00.00 ENCOUNTER FOR ANNUAL HEALTH EXAMINATION: Primary | ICD-10-CM

## 2024-05-13 DIAGNOSIS — Z12.31 ENCOUNTER FOR SCREENING MAMMOGRAM FOR MALIGNANT NEOPLASM OF BREAST: ICD-10-CM

## 2024-05-13 DIAGNOSIS — M81.0 AGE-RELATED OSTEOPOROSIS WITHOUT CURRENT PATHOLOGICAL FRACTURE: ICD-10-CM

## 2024-05-13 NOTE — PATIENT INSTRUCTIONS
Tracy Neville's SCREENING SCHEDULE   Tests on this list are recommended by your physician but may not be covered, or covered at this frequency, by your insurer.   Please check with your insurance carrier before scheduling to verify coverage.   PREVENTATIVE SERVICES FREQUENCY &  COVERAGE DETAILS LAST COMPLETION DATE   Diabetes Screening    Fasting Blood Sugar /  Glucose    One screening every 12 months if never tested or if previously tested but not diagnosed with pre-diabetes   One screening every 6 months if diagnosed with pre-diabetes Lab Results   Component Value Date    GLU 93 04/15/2023        Cardiovascular Disease Screening    Lipid Panel  Cholesterol  Lipoprotein (HDL)  Triglycerides Covered every 5 years for all Medicare beneficiaries without apparent signs or symptoms of cardiovascular disease Lab Results   Component Value Date    CHOLEST 191 04/15/2023    HDL 57 04/15/2023     (H) 04/15/2023    TRIG 134 04/15/2023         Electrocardiogram (EKG)   Covered if needed at Welcome to Medicare, and non-screening if indicated for medical reasons -      Ultrasound Screening for Abdominal Aortic Aneurysm (AAA) Covered once in a lifetime for one of the following risk factors   • Men who are 65-75 years old and have ever smoked   • Anyone with a family history -     Colorectal Cancer Screening  Covered for ages 50-85; only need ONE of the following:    Colonoscopy   Covered every 10 years    Covered every 2 years if patient is at high risk or previous colonoscopy was abnormal 03/30/2022    Health Maintenance   Topic Date Due   • Colorectal Cancer Screening  03/30/2032       Flexible Sigmoidoscopy   Covered every 4 years -    Fecal Occult Blood Test Covered annually -   Bone Density Screening    Bone density screening    Covered every 2 years after age 65 if diagnosed with risk of osteoporosis or estrogen deficiency.    Covered yearly for long-term glucocorticoid medication use (Steroids) Last Dexa  Scan:    XR DEXA BONE DENSITOMETRY (CPT=77080) 10/29/2021      No recommendations at this time   Pap and Pelvic    Pap   Covered every 2 years for women at normal risk; Annually if at high risk -  No recommendations at this time    Chlamydia Annually if high risk -  No recommendations at this time   Screening Mammogram    Mammogram     Recommend annually for all female patients aged 40 and older    One baseline mammogram covered for patients aged 35-39 12/07/2023    Health Maintenance   Topic Date Due   • Mammogram  12/07/2024       Immunizations    Influenza Covered once per flu season  Please get every year 12/08/2023  No recommendations at this time    Pneumococcal Each vaccine ([id number] & [id number]) covered once after 65 Prevnar 13: 12/21/2021    Nsgexpary37: 03/02/2023     No recommendations at this time    Hepatitis B One screening covered for patients with certain risk factors   -  No recommendations at this time    Tetanus Toxoid Not covered by Medicare Part B unless medically necessary (cut with metal); may be covered with your pharmacy prescription benefits -    Tetanus, Diptheria and Pertusis TD and TDaP Not covered by Medicare Part B -  No recommendations at this time    Zoster Not covered by Medicare Part B; may be covered with your pharmacy  prescription benefits -  No recommendations at this time

## 2024-05-14 ENCOUNTER — LAB ENCOUNTER (OUTPATIENT)
Dept: LAB | Age: 69
End: 2024-05-14
Attending: INTERNAL MEDICINE

## 2024-05-14 DIAGNOSIS — M81.0 AGE-RELATED OSTEOPOROSIS WITHOUT CURRENT PATHOLOGICAL FRACTURE: ICD-10-CM

## 2024-05-14 DIAGNOSIS — E78.5 HYPERLIPIDEMIA, UNSPECIFIED HYPERLIPIDEMIA TYPE: ICD-10-CM

## 2024-05-14 LAB
ALBUMIN SERPL-MCNC: 3.8 G/DL (ref 3.4–5)
ALBUMIN/GLOB SERPL: 1.1 {RATIO} (ref 1–2)
ALP LIVER SERPL-CCNC: 67 U/L
ALT SERPL-CCNC: 24 U/L
ANION GAP SERPL CALC-SCNC: 5 MMOL/L (ref 0–18)
AST SERPL-CCNC: 28 U/L (ref 15–37)
BILIRUB SERPL-MCNC: 0.4 MG/DL (ref 0.1–2)
BUN BLD-MCNC: 16 MG/DL (ref 9–23)
CALCIUM BLD-MCNC: 9.3 MG/DL (ref 8.5–10.1)
CHLORIDE SERPL-SCNC: 111 MMOL/L (ref 98–112)
CHOLEST SERPL-MCNC: 186 MG/DL (ref ?–200)
CO2 SERPL-SCNC: 27 MMOL/L (ref 21–32)
CREAT BLD-MCNC: 1.06 MG/DL
EGFRCR SERPLBLD CKD-EPI 2021: 57 ML/MIN/1.73M2 (ref 60–?)
FASTING PATIENT LIPID ANSWER: YES
FASTING STATUS PATIENT QL REPORTED: YES
GLOBULIN PLAS-MCNC: 3.6 G/DL (ref 2.8–4.4)
GLUCOSE BLD-MCNC: 90 MG/DL (ref 70–99)
HDLC SERPL-MCNC: 49 MG/DL (ref 40–59)
LDLC SERPL CALC-MCNC: 119 MG/DL (ref ?–100)
NONHDLC SERPL-MCNC: 137 MG/DL (ref ?–130)
OSMOLALITY SERPL CALC.SUM OF ELEC: 297 MOSM/KG (ref 275–295)
POTASSIUM SERPL-SCNC: 4.5 MMOL/L (ref 3.5–5.1)
PROT SERPL-MCNC: 7.4 G/DL (ref 6.4–8.2)
SODIUM SERPL-SCNC: 143 MMOL/L (ref 136–145)
TRIGL SERPL-MCNC: 100 MG/DL (ref 30–149)
VIT D+METAB SERPL-MCNC: 33.6 NG/ML (ref 30–100)
VLDLC SERPL CALC-MCNC: 17 MG/DL (ref 0–30)

## 2024-05-14 PROCEDURE — 80053 COMPREHEN METABOLIC PANEL: CPT

## 2024-05-14 PROCEDURE — 82306 VITAMIN D 25 HYDROXY: CPT

## 2024-05-14 PROCEDURE — 80061 LIPID PANEL: CPT

## 2024-05-15 DIAGNOSIS — R79.89 ELEVATED SERUM CREATININE: Primary | ICD-10-CM

## 2024-06-12 ENCOUNTER — TELEPHONE (OUTPATIENT)
Dept: INTERNAL MEDICINE CLINIC | Facility: CLINIC | Age: 69
End: 2024-06-12

## 2024-06-12 NOTE — TELEPHONE ENCOUNTER
Incoming (mail or fax):  fax  Received from:  Counts include 234 beds at the [facility]  Documentation given to:  Dr Cabrera     Visit report

## 2024-07-15 ENCOUNTER — PATIENT MESSAGE (OUTPATIENT)
Dept: INTERNAL MEDICINE CLINIC | Facility: CLINIC | Age: 69
End: 2024-07-15

## 2024-07-15 NOTE — TELEPHONE ENCOUNTER
From: Tracy Neville  To: Bárbara Cabrera  Sent: 7/15/2024 10:13 AM CDT  Subject: Sciatica pain    Hi  Good morning  For last 3/4 days I am having pain in the left hip and in the calf area, but not in thigh or foot.and getting tough to get down off the bed . Taking pain killers like Advil and Motrin and trying some stretches and used a salonpas patch. Could you advise me and guide some that could alleviate the pain and better the movement.  Thank you  Tracy

## 2024-07-15 NOTE — TELEPHONE ENCOUNTER
Spoke to patient. Pain about 5-16/10 but gets better w/walking. Advil makes better but worsens after laying down. Hip/calf pain nagging. Denies n/t. Denies symptoms of saddle anesthesia. Denies changes to bowel/bladder. Denies to calf region: redness, warmth, swelling. Denies sob. Scheduled for appoinment. Advised to go to er in meantime for any worsening matters or emergent symptoms. Verbalizes understanding. Offered appoinment as soon as today.    Future Appointments   Date Time Provider Department Center   7/16/2024  9:00 AM Nayeli Benoit, [facility]

## 2024-07-16 ENCOUNTER — OFFICE VISIT (OUTPATIENT)
Dept: INTERNAL MEDICINE CLINIC | Facility: CLINIC | Age: 69
End: 2024-07-16
Payer: MEDICARE

## 2024-07-16 VITALS
HEART RATE: 71 BPM | SYSTOLIC BLOOD PRESSURE: 138 MMHG | DIASTOLIC BLOOD PRESSURE: 86 MMHG | RESPIRATION RATE: 16 BRPM | BODY MASS INDEX: 25.02 KG/M2 | OXYGEN SATURATION: 97 % | TEMPERATURE: 98 F | HEIGHT: 60.5 IN | WEIGHT: 130.81 LBS

## 2024-07-16 DIAGNOSIS — M54.32 SCIATICA OF LEFT SIDE: Primary | ICD-10-CM

## 2024-07-16 PROCEDURE — 99213 OFFICE O/P EST LOW 20 MIN: CPT | Performed by: NURSE PRACTITIONER

## 2024-07-16 PROCEDURE — G2211 COMPLEX E/M VISIT ADD ON: HCPCS | Performed by: NURSE PRACTITIONER

## 2024-07-16 NOTE — PROGRESS NOTES
CHIEF COMPLAINT:     Chief Complaint   Patient presents with    Pain     Pain radiating from Left Buttock down to calf the last couple days but today after waking up and walking around the pain feels better and not as severe as before       HPI:   Tracy Neville is a 68 year old female coming in with complaints of pain to the left buttock that radiates down the left calf. Symptoms started 1 week ago and was improving at first but got worse within the past 2 days. She took motrin with some relief. Reports the pain is significantly better today. Denies any saddle anesthesia, weakness, or loss of B/B function.     History reviewed. No pertinent past medical history.   Past Surgical History:   Procedure Laterality Date    Colonoscopy      Colonoscopy N/A 2022    Procedure: COLONOSCOPY WITH COLD SNARE POLYPECTOMY;  Surgeon: Clay Garcia DO;  Location:  ENDOSCOPY    [facility]      Normal      Social History:  Social History     Socioeconomic History    Marital status:    Tobacco Use    Smoking status: Never     Passive exposure: Never    Smokeless tobacco: Never   Vaping Use    Vaping status: Never Used   Substance and Sexual Activity    Alcohol use: Never    Drug use: Never   Other Topics Concern    Caffeine Concern No    Exercise No    Seat Belt No    Special Diet No    Stress Concern No    Weight Concern No      Family History:  Family History   Problem Relation Age of Onset    Cancer Brother 61        liver cancer      Allergies:  No Known Allergies   Current Meds:  Current Outpatient Medications   Medication Sig Dispense Refill    Latanoprost-Timolol Maleate 0.005-0.5 % Ophthalmic Solution daily.      Cholecalciferol (VITAMIN D3) 25 MCG (1000 UT) Oral Cap Take 1 tablet by mouth daily.      Multiple Vitamin (MULTIVITAMIN OR) Take by mouth daily.         Counseling given: Not Answered       REVIEW OF SYSTEMS:   See HPI.    EXAM:     /86   Pulse 71   Temp 97.8 °F (36.6 °C) (Temporal)   Resp  16   Ht 5' 0.5\" (1.537 m)   Wt 130 lb 12.8 oz (59.3 kg)   SpO2 97%   BMI 25.12 kg/m²   Body mass index is 25.12 kg/m².   Vital signs reviewed. Appears stated age, well groomed, in no acute distress.  Physical Exam:  GENERAL: Patient is alert, awake and oriented, well developed, well nourished.  HEENT: Head: Normocephalic, atraumatic.   HEART: RRR without murmur.  LUNGS: Clear to auscultation bilaterally, no rales/rhonchi/wheezing.  ABDOMEN: good BS's, no masses, HSM or tenderness  MUSCULOSKELETAL: No TTP to the lumbar paraspinal tissues or left buttocks. SLR negative bilaterally. No obvious joint deformity or swelling.   EXTREMITIES: No edema, no cyanosis, no clubbing, FROM  NEURO: Oriented time three.     LABS:      Lab Results   Component Value Date    WBC 6.6 04/15/2023    RBC 4.35 04/15/2023    HGB 13.2 04/15/2023    HCT 40.3 04/15/2023    MCV 92.6 04/15/2023    MCH 30.3 04/15/2023    MCHC 32.8 04/15/2023    RDW 12.4 04/15/2023    .0 04/15/2023      Lab Results   Component Value Date    GLU 90 05/14/2024    BUN 16 05/14/2024    BUNCREA 27.8 (H) 03/01/2022    CREATSERUM 1.06 (H) 05/14/2024    ANIONGAP 5 05/14/2024    GFRNAA 78 07/19/2022    GFRAA 90 07/19/2022    CA 9.3 05/14/2024    OSMOCALC 297 (H) 05/14/2024    ALKPHO 67 05/14/2024    AST 28 05/14/2024    ALT 24 05/14/2024    BILT 0.4 05/14/2024    TP 7.4 05/14/2024    ALB 3.8 05/14/2024    GLOBULIN 3.6 05/14/2024     05/14/2024    K 4.5 05/14/2024     05/14/2024    CO2 27.0 05/14/2024      Lab Results   Component Value Date    CHOLEST 186 05/14/2024    TRIG 100 05/14/2024    HDL 49 05/14/2024     (H) 05/14/2024    VLDL 17 05/14/2024    NONHDLC 137 (H) 05/14/2024      Lab Results   Component Value Date    T4F 1.0 08/21/2023    TSH 3.250 02/12/2024      No results found for: \"EAG\", \"A1C\"     IMAGING:     No results found.     ASSESSMENT AND PLAN:   1. Sciatica of left side  -Improved now  -Conservative management  discussed  -Alternate between heat/ice  -Alternate between tylenol/motrin prn  -Start stretching exercises  -Avoid prolonged sitting or standing  -Start PT if symptoms recur   - Physical Therapy Referral - [facility]     The patient indicates understanding of these issues and agrees to the plan.  Return if symptoms worsen or fail to improve.    Nayeli Benoit, APRN  7/16/2024

## 2024-07-22 ENCOUNTER — TELEPHONE (OUTPATIENT)
Dept: INTERNAL MEDICINE CLINIC | Facility: CLINIC | Age: 69
End: 2024-07-22

## 2024-07-22 NOTE — TELEPHONE ENCOUNTER
Oanh from [facility] asking for PT order to be faxed. I did fax, with note to make sure this patient called her insurance to be sure she can use [facility].

## 2024-07-26 ENCOUNTER — TELEPHONE (OUTPATIENT)
Dept: INTERNAL MEDICINE CLINIC | Facility: CLINIC | Age: 69
End: 2024-07-26

## 2024-08-08 ENCOUNTER — APPOINTMENT (OUTPATIENT)
Dept: PHYSICAL THERAPY | Age: 69
End: 2024-08-08
Attending: NURSE PRACTITIONER
Payer: MEDICARE

## 2024-08-13 ENCOUNTER — APPOINTMENT (OUTPATIENT)
Dept: PHYSICAL THERAPY | Age: 69
End: 2024-08-13
Attending: NURSE PRACTITIONER
Payer: MEDICARE

## 2024-08-15 ENCOUNTER — APPOINTMENT (OUTPATIENT)
Dept: PHYSICAL THERAPY | Age: 69
End: 2024-08-15
Attending: NURSE PRACTITIONER
Payer: MEDICARE

## 2024-08-20 ENCOUNTER — APPOINTMENT (OUTPATIENT)
Dept: PHYSICAL THERAPY | Age: 69
End: 2024-08-20
Attending: NURSE PRACTITIONER
Payer: MEDICARE

## 2024-08-22 ENCOUNTER — APPOINTMENT (OUTPATIENT)
Dept: PHYSICAL THERAPY | Age: 69
End: 2024-08-22
Attending: NURSE PRACTITIONER
Payer: MEDICARE

## 2024-08-26 ENCOUNTER — APPOINTMENT (OUTPATIENT)
Dept: PHYSICAL THERAPY | Age: 69
End: 2024-08-26
Attending: NURSE PRACTITIONER
Payer: MEDICARE

## 2024-08-28 ENCOUNTER — APPOINTMENT (OUTPATIENT)
Dept: PHYSICAL THERAPY | Age: 69
End: 2024-08-28
Attending: NURSE PRACTITIONER
Payer: MEDICARE

## 2024-09-03 ENCOUNTER — APPOINTMENT (OUTPATIENT)
Dept: PHYSICAL THERAPY | Age: 69
End: 2024-09-03
Attending: NURSE PRACTITIONER
Payer: MEDICARE

## 2025-01-04 ENCOUNTER — HOSPITAL ENCOUNTER (OUTPATIENT)
Dept: MAMMOGRAPHY | Age: 70
Discharge: HOME | End: 2025-01-04
Attending: INTERNAL MEDICINE
Payer: MEDICARE

## 2025-01-04 DIAGNOSIS — Z12.31 ENCOUNTER FOR SCREENING MAMMOGRAM FOR MALIGNANT NEOPLASM OF BREAST: ICD-10-CM

## 2025-01-04 PROCEDURE — 77067 SCR MAMMO BI INCL CAD: CPT | Performed by: INTERNAL MEDICINE

## 2025-01-04 PROCEDURE — 77063 BREAST TOMOSYNTHESIS BI: CPT | Performed by: INTERNAL MEDICINE

## 2025-01-21 ENCOUNTER — TELEPHONE (OUTPATIENT)
Dept: INTERNAL MEDICINE CLINIC | Facility: CLINIC | Age: 70
End: 2025-01-21

## 2025-01-21 DIAGNOSIS — M54.32 SCIATICA OF LEFT SIDE: Primary | ICD-10-CM

## 2025-05-08 ENCOUNTER — TELEPHONE (OUTPATIENT)
Dept: INTERNAL MEDICINE CLINIC | Facility: CLINIC | Age: 70
End: 2025-05-08

## 2025-05-08 NOTE — TELEPHONE ENCOUNTER
Incoming (mail or fax):  fax   Received from:  [facility]   Documentation given to:  Dr Cabrera     Insurance house call

## 2025-05-11 NOTE — PROGRESS NOTES
Subjective:   Tracy Neville is a 69 year old female who presents for a MA AHA (Medicare Advantage Annual Health Assessment) and Subsequent Annual Wellness visit (Pt already had Initial Annual Wellness) and scheduled follow up of multiple significant but stable problems.          The following individual(s) verbally consented to be recorded using ambient AI listening technology and understand that they can each withdraw their consent to this listening technology at any point by asking the clinician to turn off or pause the recording:    Patient name: Tracy Neville  Additional names:  none    Mammo done 1/2025. Had dense breast advisory. She declines additional imaging. Ordered mammo for next year.   Breast and pelvic done 4/2023. Breast exam done today. No indication for pelvic, not sexually active.   Dexa done 10/2021 showed osteoporosis. She only took reclast once or twice. She wants to do a dexa.   Colonoscopy done 3/2022, repeat in 10 years. Had 1 adenoma.      Up to date prevnar, pneumovax, tdap, shingrix.   Get covid booster (last 12/2023) at her local pharmacy.      AHA flowsheet reviewed.   No falls.   Memory testing normal.   Mood has been stable. No depression.   Seeing eye doctor yearly.        History of Present Illness  She has dense breast tissue confirmed by past mammograms and ultrasounds, including the most recent mammogram in January. She does not wish to undergo another ultrasound at this time.    She has a history of osteoporosis and has received Reclast infusions twice. Her brothers advised against further Reclast treatments.     In July, she experienced sciatica pain, which resolved after physical therapy. She is now enrolled in a gym and participates in various exercises such as track, treadmill, cycling, chest press, and rowing. She reports no current pain and is able to perform activities without assistance.    No urinary leakage, chest pain, shortness of breath, headaches, dizziness,  blood in bowel movements, depression, or memory issues.    She does not smoke, drink alcohol, or consume non-vegetarian food. She has not received the COVID booster or RSV vaccine.    History/Other:   Fall Risk Assessment:   She has been screened for Falls and is low risk.      Cognitive Assessment:   She had a completely normal cognitive assessment - see flowsheet entries       Functional Ability/Status:   Tracy Neville has a completely normal functional assessment. See flowsheet for details.      Depression Screening (PHQ):  PHQ-2 SCORE: 0  , done 2025          Advanced Directives:   She does NOT have a Living Will. [Do you have a living will?: No]  She does NOT have a Power of  for Health Care. [Do you have a healthcare power of ?: No]  Discussed with patient and provided information.        Problem List[1]  Allergies:  She has no known allergies.    Current Medications:  Active Meds, Sig Only[2]    Medical History:  She  has no past medical history on file.  Surgical History:  She  has a past surgical history that includes colonoscopy; colonoscopy (N/A, 2022); and  ().   Family History:  Her family history includes Cancer (age of onset: 61) in her brother.  Social History:  She  reports that she has never smoked. She has never been exposed to tobacco smoke. She has never used smokeless tobacco. She reports that she does not drink alcohol and does not use drugs.    Tobacco:  She has never smoked tobacco.    CAGE Alcohol Screen:   CAGE screening score of 0 on 2025, showing low risk of alcohol abuse.      Patient Care Team:  Bárbara Cabrera MD as PCP - General (Internal Medicine)    Review of Systems  GENERAL: feels well otherwise  SKIN: denies any unusual skin lesions  EYES:denies blurred vision or double vision  HEENT: denies nasal congestion, sinus pain or ST  LUNGS: denies shortness of breath with exertion  CARDIOVASCULAR: denies chest pain on exertion  GI: denies  abdominal pain,denies heartburn  : denies dysuria, vaginal discharge or itching  MUSCULOSKELETAL: denies back pain  NEURO: denies headaches  PSYCHE: denies depression or anxiety  HEMATOLOGIC: denies hx of anemia  ENDOCRINE: denies thyroid history  ALL/ASTHMA: denies hx of allergy or asthma     Objective:   Physical Exam  General Appearance:  Alert, cooperative, no distress, appears stated age   Head:  Normocephalic, without obvious abnormality, atraumatic   Eyes:  PERRL, conjunctiva/corneas clear, EOM's intact both eyes   Ears:  Normal TM's and external ear canals, both ears   Nose: Nares normal, septum midline,mucosa normal, no drainage or sinus tenderness   Throat: Lips, mucosa, and tongue normal; teeth and gums normal   Neck: Supple, symmetrical, trachea midline, no adenopathy;  thyroid: not enlarged, symmetric, no tenderness/mass/nodules; no carotid bruit or JVD   Back:   Symmetric, no curvature, ROM normal, no CVA tenderness   Lungs:   Clear to auscultation bilaterally, respirations unlabored   Heart:  Regular rate and rhythm, S1 and S2 normal, no murmur, rub, or gallop   Abdomen:   Soft, non-tender, bowel sounds active all four quadrants,  no masses, no organomegaly   Pelvic: Deferred   Extremities: Extremities normal, atraumatic, no cyanosis or edema   Pulses: 2+ and symmetric   Skin: Skin color, texture, turgor normal, no rashes or lesions   Lymph nodes: Cervical, supraclavicular, and axillary nodes normal   Neurologic: Normal       /60   Pulse 90   Temp 97.6 °F (36.4 °C) (Temporal)   Resp 16   Ht 5' 0.24\" (1.53 m)   Wt 153 lb (69.4 kg)   SpO2 98%   BMI 29.65 kg/m²  Estimated body mass index is 29.65 kg/m² as calculated from the following:    Height as of this encounter: 5' 0.24\" (1.53 m).    Weight as of this encounter: 153 lb (69.4 kg).    Medicare Hearing Assessment:   Hearing Screening    Time taken: 5/14/2025 12:57 PM  Screening Method: Finger Rub  Finger Rub Result: Pass                Assessment & Plan:   Tracy Neville is a 69 year old female who presents for a Medicare Assessment.     1. Encounter for annual health examination  Mammo done 1/2025. Had dense breast advisory. She declines additional imaging. Ordered mammo for next year.   Breast and pelvic done 4/2023. Breast exam done today. No indication for pelvic, not sexually active.   Dexa done 10/2021 showed osteoporosis. She only took reclast once or twice. She wants to do a dexa.   Colonoscopy done 3/2022, repeat in 10 years. Had 1 adenoma.      Up to date prevnar, pneumovax, tdap, shingrix.   Get covid booster (last 12/2023) at her local pharmacy.      AHA flowsheet reviewed.   No falls.   Memory testing normal.   Mood has been stable. No depression.   Seeing eye doctor yearly.      2. Mixed hyperlipidemia  Do labs.   Continue diet control.   - Comp Metabolic Panel (14); Future  - Lipid Panel; Future  - TSH W Reflex To Free T4; Future  - CBC W Differential W Platelet [E]; Future    3. Age-related osteoporosis without current pathological fracture  Dexa ordered.   If still with osteoporosis she should follow up with endocrine.     4. Encounter for screening mammogram for malignant neoplasm of breast  - [facility] JEFF 2D+3D SCREENING BILAT (CPT=77067/08831); Future    5. Postmenopausal  - XR DEXA BONE DENSITOMETRY (CPT=77080); Future      The patient indicates understanding of these issues and agrees to the plan.  Reinforced healthy diet, lifestyle, and exercise.      Return in about 1 year (around 5/14/2026) for supervisit.     Bárbara Cabrera MD, 5/11/2025     Supplementary Documentation:   General Health:  In the past six months, have you lost more than 10 pounds without trying?: (Patient-Rptd) 2 - No  Has your appetite been poor?: (Patient-Rptd) No  Type of Diet: (Patient-Rptd) Vegetarian  How does the patient maintain a good energy level?: (Patient-Rptd) Appropriate Exercise, Daily Walks, Stretching  How would you describe your  daily physical activity?: (Patient-Rptd) Moderate  How would you describe your current health state?: (Patient-Rptd) Good  How do you maintain positive mental well-being?: (Patient-Rptd) Social Interaction, Puzzles, Games, Visiting Friends, Visiting Family  On a scale of 0 to 10, with 0 being no pain and 10 being severe pain, what is your pain level?: (Patient-Rptd) 0 - (None)  In the past six months, have you experienced urine leakage?: (Patient-Rptd) 0-No  At any time do you feel concerned for the safety/well-being of yourself and/or your children, in your home or elsewhere?: (Patient-Rptd) No  Have you had any immunizations at another office such as Influenza, Hepatitis B, Tetanus, or Pneumococcal?: (Patient-Rptd) Yes    Health Maintenance   Topic Date Due    COVID-19 Vaccine (7 - 2024-25 season) 09/01/2024    Annual Well Visit  01/01/2025    Annual Depression Screening  01/01/2025    Fall Risk Screening (Annual)  01/01/2025    Influenza Vaccine (Season Ended) 10/01/2025    Mammogram  01/04/2026    Colorectal Cancer Screening  03/30/2032    DEXA Scan  Completed    Pneumococcal Vaccine: 50+ Years  Completed    Zoster Vaccines  Completed    Meningococcal B Vaccine  Aged Out            [1]   Patient Active Problem List  Diagnosis    Age-related osteoporosis without current pathological fracture    Hyperlipidemia   [2]   Outpatient Medications Marked as Taking for the 5/14/25 encounter (Office Visit) with Bárbara Cabrera MD   Medication Sig    latanoprost 0.005 % Ophthalmic Solution Place 1 drop into both eyes nightly.    Cholecalciferol (VITAMIN D3) 25 MCG (1000 UT) Oral Cap Take 1 tablet by mouth daily.    Multiple Vitamin (MULTIVITAMIN OR) Take by mouth daily.

## 2025-05-14 ENCOUNTER — OFFICE VISIT (OUTPATIENT)
Dept: INTERNAL MEDICINE CLINIC | Facility: CLINIC | Age: 70
End: 2025-05-14
Payer: MEDICARE

## 2025-05-14 VITALS
RESPIRATION RATE: 16 BRPM | BODY MASS INDEX: 29.65 KG/M2 | HEIGHT: 60.24 IN | SYSTOLIC BLOOD PRESSURE: 138 MMHG | DIASTOLIC BLOOD PRESSURE: 60 MMHG | WEIGHT: 153 LBS | OXYGEN SATURATION: 98 % | HEART RATE: 90 BPM | TEMPERATURE: 98 F

## 2025-05-14 DIAGNOSIS — Z12.31 ENCOUNTER FOR SCREENING MAMMOGRAM FOR MALIGNANT NEOPLASM OF BREAST: ICD-10-CM

## 2025-05-14 DIAGNOSIS — Z00.00 ENCOUNTER FOR ANNUAL HEALTH EXAMINATION: Primary | ICD-10-CM

## 2025-05-14 DIAGNOSIS — M81.0 AGE-RELATED OSTEOPOROSIS WITHOUT CURRENT PATHOLOGICAL FRACTURE: ICD-10-CM

## 2025-05-14 DIAGNOSIS — Z78.0 POSTMENOPAUSAL: ICD-10-CM

## 2025-05-14 DIAGNOSIS — E78.2 MIXED HYPERLIPIDEMIA: ICD-10-CM

## 2025-05-14 RX ORDER — LATANOPROST 50 UG/ML
1 SOLUTION/ DROPS OPHTHALMIC NIGHTLY
COMMUNITY
Start: 2025-02-24

## 2025-05-17 ENCOUNTER — LAB ENCOUNTER (OUTPATIENT)
Dept: LAB | Facility: HOSPITAL | Age: 70
End: 2025-05-17
Attending: INTERNAL MEDICINE
Payer: MEDICARE

## 2025-05-17 DIAGNOSIS — E78.2 MIXED HYPERLIPIDEMIA: ICD-10-CM

## 2025-05-17 LAB
ALBUMIN SERPL-MCNC: 4.7 G/DL (ref 3.2–4.8)
ALBUMIN/GLOB SERPL: 1.6 {RATIO} (ref 1–2)
ALP LIVER SERPL-CCNC: 75 U/L (ref 55–142)
ALT SERPL-CCNC: 15 U/L (ref 10–49)
ANION GAP SERPL CALC-SCNC: 5 MMOL/L (ref 0–18)
AST SERPL-CCNC: 25 U/L (ref ?–34)
BASOPHILS # BLD AUTO: 0.04 X10(3) UL (ref 0–0.2)
BASOPHILS NFR BLD AUTO: 0.5 %
BILIRUB SERPL-MCNC: 0.4 MG/DL (ref 0.2–1.1)
BUN BLD-MCNC: 12 MG/DL (ref 9–23)
CALCIUM BLD-MCNC: 9.7 MG/DL (ref 8.7–10.6)
CHLORIDE SERPL-SCNC: 108 MMOL/L (ref 98–112)
CHOLEST SERPL-MCNC: 190 MG/DL (ref ?–200)
CO2 SERPL-SCNC: 27 MMOL/L (ref 21–32)
CREAT BLD-MCNC: 0.86 MG/DL (ref 0.55–1.02)
EGFRCR SERPLBLD CKD-EPI 2021: 73 ML/MIN/1.73M2 (ref 60–?)
EOSINOPHIL # BLD AUTO: 0.16 X10(3) UL (ref 0–0.7)
EOSINOPHIL NFR BLD AUTO: 2.1 %
ERYTHROCYTE [DISTWIDTH] IN BLOOD BY AUTOMATED COUNT: 12.6 %
FASTING PATIENT LIPID ANSWER: YES
FASTING STATUS PATIENT QL REPORTED: YES
GLOBULIN PLAS-MCNC: 2.9 G/DL (ref 2–3.5)
GLUCOSE BLD-MCNC: 92 MG/DL (ref 70–99)
HCT VFR BLD AUTO: 38.4 % (ref 35–48)
HDLC SERPL-MCNC: 46 MG/DL (ref 40–59)
HGB BLD-MCNC: 12.7 G/DL (ref 12–16)
IMM GRANULOCYTES # BLD AUTO: 0.04 X10(3) UL (ref 0–1)
IMM GRANULOCYTES NFR BLD: 0.5 %
LDLC SERPL CALC-MCNC: 121 MG/DL (ref ?–100)
LYMPHOCYTES # BLD AUTO: 1.76 X10(3) UL (ref 1–4)
LYMPHOCYTES NFR BLD AUTO: 23.4 %
MCH RBC QN AUTO: 30.7 PG (ref 26–34)
MCHC RBC AUTO-ENTMCNC: 33.1 G/DL (ref 31–37)
MCV RBC AUTO: 92.8 FL (ref 80–100)
MONOCYTES # BLD AUTO: 0.59 X10(3) UL (ref 0.1–1)
MONOCYTES NFR BLD AUTO: 7.8 %
NEUTROPHILS # BLD AUTO: 4.94 X10 (3) UL (ref 1.5–7.7)
NEUTROPHILS # BLD AUTO: 4.94 X10(3) UL (ref 1.5–7.7)
NEUTROPHILS NFR BLD AUTO: 65.7 %
NONHDLC SERPL-MCNC: 144 MG/DL (ref ?–130)
OSMOLALITY SERPL CALC.SUM OF ELEC: 289 MOSM/KG (ref 275–295)
PLATELET # BLD AUTO: 199 10(3)UL (ref 150–450)
POTASSIUM SERPL-SCNC: 4.4 MMOL/L (ref 3.5–5.1)
PROT SERPL-MCNC: 7.6 G/DL (ref 5.7–8.2)
RBC # BLD AUTO: 4.14 X10(6)UL (ref 3.8–5.3)
SODIUM SERPL-SCNC: 140 MMOL/L (ref 136–145)
T4 FREE SERPL-MCNC: 1.1 NG/DL (ref 0.8–1.7)
TRIGL SERPL-MCNC: 129 MG/DL (ref 30–149)
TSI SER-ACNC: 6.13 UIU/ML (ref 0.55–4.78)
VLDLC SERPL CALC-MCNC: 23 MG/DL (ref 0–30)
WBC # BLD AUTO: 7.5 X10(3) UL (ref 4–11)

## 2025-05-17 PROCEDURE — 80061 LIPID PANEL: CPT

## 2025-05-17 PROCEDURE — 80053 COMPREHEN METABOLIC PANEL: CPT

## 2025-05-17 PROCEDURE — 84443 ASSAY THYROID STIM HORMONE: CPT

## 2025-05-17 PROCEDURE — 36415 COLL VENOUS BLD VENIPUNCTURE: CPT

## 2025-05-17 PROCEDURE — 85025 COMPLETE CBC W/AUTO DIFF WBC: CPT

## 2025-05-17 PROCEDURE — 84439 ASSAY OF FREE THYROXINE: CPT

## 2025-05-21 ENCOUNTER — MED REC SCAN ONLY (OUTPATIENT)
Dept: INTERNAL MEDICINE CLINIC | Facility: CLINIC | Age: 70
End: 2025-05-21

## (undated) DIAGNOSIS — R79.89 HIGH SERUM CALCIUM: Primary | ICD-10-CM

## (undated) DEVICE — SNARE 9MM 230CM 2.4MM EXACTO

## (undated) DEVICE — TRAP SPEC REMOVAL ETRAP 15CM

## (undated) DEVICE — FILTERLINE NASAL ADULT O2/CO2

## (undated) DEVICE — ENDOSCOPY PACK - LOWER: Brand: MEDLINE INDUSTRIES, INC.

## (undated) DEVICE — MEDI-VAC NON-CONDUCTIVE SUCTION TUBING: Brand: CARDINAL HEALTH

## (undated) DEVICE — Device: Brand: DEFENDO AIR/WATER/SUCTION AND BIOPSY VALVE

## (undated) DEVICE — MEDI-VAC SUCTION HANDLE REGULAR CAPACITY: Brand: CARDINAL HEALTH

## (undated) DEVICE — 1200CC GUARDIAN II: Brand: GUARDIAN

## (undated) NOTE — Clinical Note
Thank you for the consult. I saw Ms. Zaira Davidson in the endocrine/diabetes clinic today. Please see attached my note. Please feel free to contact me with any questions. Thanks!

## (undated) NOTE — LETTER
05/27/21    [place] [address]      Dear Geovanny Mcdaniel,    [id number] records indicate that you have outstanding testing that was ordered for you and has not yet been completed:          Mammogram Digital Screen, Bilateral      Dexa Scan